# Patient Record
(demographics unavailable — no encounter records)

---

## 2024-10-24 NOTE — HISTORY OF PRESENT ILLNESS
[FreeTextEntry1] : hypoT, morbid obese, low testosterone \par  he has SHERLEY  treated with CPAP .Uses it every day\par  had CVA and has residual L weakness and memory impairment (searching for words) \par

## 2024-10-24 NOTE — PHYSICAL EXAM
[Alert] : alert [Well Nourished] : well nourished [Obese] : obese [No Acute Distress] : no acute distress [Well Developed] : well developed [Normal Sclera/Conjunctiva] : normal sclera/conjunctiva [EOMI] : extra ocular movement intact [No Proptosis] : no proptosis [Normal Oropharynx] : the oropharynx was normal [Thyroid Not Enlarged] : the thyroid was not enlarged [No Thyroid Nodules] : no palpable thyroid nodules [No Respiratory Distress] : no respiratory distress [No Accessory Muscle Use] : no accessory muscle use [Clear to Auscultation] : lungs were clear to auscultation bilaterally [Normal S1, S2] : normal S1 and S2 [Normal Rate] : heart rate was normal [Regular Rhythm] : with a regular rhythm [No Edema] : no peripheral edema [Pedal Pulses Normal] : the pedal pulses are present [Normal Bowel Sounds] : normal bowel sounds [Not Tender] : non-tender [Not Distended] : not distended [Soft] : abdomen soft [Normal Strength/Tone] : muscle strength and tone were normal [No Tremors] : no tremors [Oriented x3] : oriented to person, place, and time [Acanthosis Nigricans] : no acanthosis nigricans

## 2024-10-24 NOTE — ASSESSMENT
[FreeTextEntry1] : Gained again another 12 lbs since March 2024   HypoT in patient with morbid obesity, h/o CVA, prediabetes, HTn and HLD. pt euthyroid clinically and biochemically.  Cont LT4 200 mcg qd take 100 mcg qd on Sundays   Take daily in AM on an empty stomach at least 30 minutes before eating or taking other medication.  NTMNG : declines compressive sx  s/p FNA in Oct 2017 benign. repeat neck US in OCt 2023 shows stable repeat US now     Prediabetes / BMI 56 :  a1c better 5.6  encouraged more exercise walking 30 min 3 x week . Walks the dog at times. minimal effort   Obesity: encouraged more exercise walking 30 min 3 x week but he does not do any.  declines bariatric surgery due to his h/o  CVA   HTN :   bp at target on meds. Continue current management. I advised low fat/low cholesterol diet, low salt diet he has terrible diet with salty chips , carbs rich, no efforts on curtailing diet   HLD: lipids very good.  Continue atorvastatin 40 mg qd   Low libido., low testosterone  continue CPAP for SHERLEY .   Low platelets: probably ITP. will monitor.   All lab results reviewed independently and discussed with patient with extensive discussion.  All questions answered Laboratory tests ordered today Continue other current medications

## 2024-10-25 NOTE — HEALTH RISK ASSESSMENT
[Fair] : ~his/her~ current health as fair  [Good] : ~his/her~  mood as  good [Yes] : Yes [1 or 2 (0 pts)] : 1 or 2 (0 points) [1] : 1) Little interest or pleasure doing things for several days (1) [0] : 2) Feeling down, depressed, or hopeless: Not at all (0) [None] : None [With Significant Other] : lives with significant other [Retired] : retired [] :  [Sexually Active] : sexually active [Feels Safe at Home] : Feels safe at home [Fully functional (bathing, dressing, toileting, transferring, walking, feeding)] : Fully functional (bathing, dressing, toileting, transferring, walking, feeding) [Fully functional (using the telephone, shopping, preparing meals, housekeeping, doing laundry, using] : Fully functional and needs no help or supervision to perform IADLs (using the telephone, shopping, preparing meals, housekeeping, doing laundry, using transportation, managing medications and managing finances) [Smoke Detector] : smoke detector [Carbon Monoxide Detector] : carbon monoxide detector [Safety elements used in home] : safety elements used in home [Seat Belt] :  uses seat belt [Sunscreen] : uses sunscreen [Never] : Never [YES] : Yes [Are there any unlocked firearms stored in your household?] : There are unlocked firearms in the household. [Are there any firearms stored in your household that are loaded?] : There are firearms stored in the household loaded. [Have you attended a firearm safety workshop or class?] : A firearm safety workshop or class has been attended. [de-identified] : does not exercise [de-identified] : Eats more salt, not as goodas it should be [[id number]] : 1 [Change in mental status noted] : No change in mental status noted [Language] : denies difficulty with language [Behavior] : denies difficulty with behavior [Handling Complex Tasks] : denies difficulty handling complex tasks [Reasoning] : denies difficulty with reasoning [Spatial Ability and Orientation] : denies difficulty with spatial ability and orientation [High Risk Behavior] : no high risk behavior [Reports changes in dental health] : Reports no changes in dental health [ColonoscopyDate] : 2023 [ColonoscopyComments] : 1 precancerous polyp removed [FreeTextEntry2] : retired  [de-identified] : hearing loss in left ear [Are there any children in your household?] : No children are in the household. [Has anyone in the household been feeling low/depressed/been struggling?] : No one in the household has been feeling low/depressed/been struggling.

## 2024-10-25 NOTE — END OF VISIT
[] : Resident [FreeTextEntry3] : BP needs better controlled, advised strict low-salt diet, daily regular exercise, lose weight, add Diovan as ordered, BMP in 1 week, follow-up in 4 weeks to recheck blood pressure.

## 2024-10-25 NOTE — HISTORY OF PRESENT ILLNESS
[Spouse] : spouse [FreeTextEntry1] : 58M presents for NPA [de-identified] : 58M with PMHx of autoimmune thyroiditis, thrombocytopenia, HTN, SHERLEY, CVA 2016 with residula left sided weakness, torn meniscus and arthritis in both knees, obesity presents for NPA. Knee pain s/p steroid shots, and shoulder pain. Also having ankle swelling more so at night. Diet is not the best, eating a lot of food from outside, high salt. Uses menthol cream for joint pain. BP in the office high today, patient only on Metoprolol. Hx of being on Losartan but previously taken off.

## 2024-12-18 NOTE — DISCUSSION/SUMMARY
[FreeTextEntry1] : 1) SOB: With risk factors for CAD. Will get an echocardiogram and stress test.  2) Chest Pain: Stress test ordered.  3) First degree AV black: Getting echo and stress test.  4) HLD: C/w Atorvastatin 20mg QD  5) HTN: Controlled. C/w Valsartan 80mg QD and metoprolol  [EKG obtained to assist in diagnosis and management of assessed problem(s)] : EKG obtained to assist in diagnosis and management of assessed problem(s)

## 2024-12-18 NOTE — PHYSICAL EXAM
[Well Nourished] : well nourished [Normal Conjunctiva] : normal conjunctiva [No Carotid Bruit] : no carotid bruit [Normal S1, S2] : normal S1, S2 [No Murmur] : no murmur [Clear Lung Fields] : clear lung fields [Soft] : abdomen soft [Normal Gait] : normal gait [No Edema] : no edema [No Rash] : no rash [Moves all extremities] : moves all extremities [Alert and Oriented] : alert and oriented

## 2024-12-18 NOTE — HISTORY OF PRESENT ILLNESS
[FreeTextEntry1] : 58 year old male here for heart check up.  Has had a stroke.  Sometimes he gets a fluttering of the heart. Father has Afib.  No MIs in family. Some GARSIA and chest pain.

## 2025-01-22 NOTE — HISTORY OF PRESENT ILLNESS
[FreeTextEntry1] : F/U HTN / SHERLEY  [de-identified] : 58M with PMHx of autoimmune thyroiditis, thyroid nodule, thrombocytopenia, HTN, SHERLEY, CVA 2016 with residual left sided weakness, torn meniscus and arthritis in both knees, obesity Here for follow-up, no new complaints Seen by cardiology - had echo 12/2024 - will be going for stress test 02/2025   Thyroid nodule - 12/2024 stable, to repeat in 1 yr  Colonoscopy - 01/2023 - nl as per pt repeat in 5 yrs

## 2025-01-22 NOTE — PLAN
[FreeTextEntry1] :  HTN:  Stable, advised strict low salt diet, daily regular exercise, to c/w meds, bmp to be monitored closely. HLD:  Advised on strict low-fat diet, daily regular exercise, to c/w meds, FLP/LFT to be monitored. OA: stable currently, advised PT / tylenol prn pain , to f/u ortho  Obesity:  Advised on dietary, lifestyle modification.  To start a daily exercise regimen. To shift diet towards intake of plant-based foods and limit intake of meats and other animal-based foods. SHERLEY: stable on CPAP Hypothyroidism: stable on Synthroid, Thyroid Nodule: stable on US - to be monitored. f/u 3 mo

## 2025-03-03 NOTE — REVIEW OF SYSTEMS
[Cough] : cough [Fever] : no fever [Chills] : no chills [Fatigue] : no fatigue [Vision Problems] : no vision problems [Sore Throat] : no sore throat [Chest Pain] : no chest pain [Palpitations] : no palpitations [Shortness Of Breath] : no shortness of breath [Wheezing] : no wheezing [Abdominal Pain] : no abdominal pain [Constipation] : no constipation [Diarrhea] : no diarrhea [Dysuria] : no dysuria [Back Pain] : no back pain [Skin Rash] : no skin rash [Headache] : no headache [Dizziness] : no dizziness

## 2025-03-03 NOTE — HISTORY OF PRESENT ILLNESS
[FreeTextEntry8] : 58 M with PMHx of autoimmune thyroiditis, thrombocytopenia, HTN, SHERLEY, CVA 2016 with residual left sided weakness, torn meniscus and arthritis in both knees, obesity presenting due to cough with green sputum for the past 2-3 days  denies fevers, chills, HA, dizziness, chest pain, SOB, GARSIA, abdominal pain, Diarrhea, constipation, or lower leg pain

## 2025-03-03 NOTE — PHYSICAL EXAM
[No Lymphadenopathy] : no lymphadenopathy [Supple] : supple [Normal Posterior Cervical Nodes] : no posterior cervical lymphadenopathy [Normal Anterior Cervical Nodes] : no anterior cervical lymphadenopathy [Normal] : no joint swelling and grossly normal strength and tone [No Rash] : no rash [Coordination Grossly Intact] : coordination grossly intact [Normal Gait] : normal gait [Normal Affect] : the affect was normal [Normal Insight/Judgement] : insight and judgment were intact [de-identified] : + 1 bl edema of lower extremities [de-identified] : Minimal weakness of L side compared to R side

## 2025-03-03 NOTE — ASSESSMENT
[FreeTextEntry1] : URI - likely viral -respiratory swab sent - Sent benzonatate for PRN cough - advised to clean CPAP well - Advised to RTC if sx worsen or call, can consider abx treatment then

## 2025-03-13 NOTE — CONSULT LETTER
[Dear  ___] : Dear  [unfilled], [Consult Letter:] : I had the pleasure of evaluating your patient, [unfilled]. [Please see my note below.] : Please see my note below. [Consult Closing:] : Thank you very much for allowing me to participate in the care of this patient.  If you have any questions, please do not hesitate to contact me. [Sincerely,] : Sincerely, [FreeTextEntry3] : Anthony Newton MD, FCCP, D. ABSM\par  Pulmonary and Sleep Medicine\par  [facility] [place]

## 2025-03-13 NOTE — REVIEW OF SYSTEMS
[Nasal Congestion] : nasal congestion [Postnasal Drip] : postnasal drip [SOB on Exertion] : sob on exertion [Back Pain] : back pain [Thyroid Problem] : thyroid problem [Obesity] : obesity [Negative] : Psychiatric [Diabetes] : no diabetes

## 2025-03-13 NOTE — RESULTS/DATA
[TextEntry] : S/N PSG from 9/3/16 revealed severe SHERLEY with an AHI of 96 and a therapeutic pressure of 12 cm of water. Chest CT from 3/11/19 revealed a 4 mm LLL nodule; f/u per [facility] program. CXR from 2/22/23 was clear per report. The patient's overall compliance is % with a >4hr compliance of % on autoCPAP with a median and max pressure of 11.4-12.6 and 14.8-15.4 cm of water, respectively with a residual AHI of 0.4-0.8 which is normal. He uses his old machine at 12 cm of water when he is away with 100% compliance and AHI<1.

## 2025-03-13 NOTE — PROCEDURE
[FreeTextEntry1] : PFTs performed previously revealed a mildly reduced FVC and FEV1 without an obstructive pattern. Lung volumes and diffusion capacity were essentially normal. Spirometry 12/13/19 - mild restriction but slightly improved from previous. PFTs 6/11/21 - Mild restriction slightly worse from previous but with normal lung volumes and mildly reduced diffusion capacity which corrected for alveolar volume; somewhat worse than previous. PFTs 6/10/22 - Mild restriction with mildly reduced lung volumes but normal diffusion capacity. [place] 2/22/23 - Moderate restriction.

## 2025-03-13 NOTE — PHYSICAL EXAM
[No Acute Distress] : no acute distress [Well Nourished] : well nourished [Normal Appearance] : normal appearance [Supple] : supple [Normal Rate/Rhythm] : normal rate/rhythm [Normal S1, S2] : normal s1, s2 [No Murmurs] : no murmurs [No Resp Distress] : no resp distress [No Acc Muscle Use] : no acc muscle use [Normal Rhythm and Effort] : normal rhythm and effort [Clear to Auscultation Bilaterally] : clear to auscultation bilaterally [No Abnormalities] : no abnormalities [Benign] : benign [Not Tender] : not tender [Soft] : soft [No Clubbing] : no clubbing [No Edema] : no edema [No Focal Deficits] : no focal deficits [Oriented x3] : oriented x3 [TextBox_2] : Morbidly obese

## 2025-03-13 NOTE — HISTORY OF PRESENT ILLNESS
[Good Compliance] : good compliance with treatment [Good Tolerance] : good tolerance of treatment [Good Symptom Control] : good symptom control [Follow-Up - Routine Clinic] : a routine clinic follow-up of [Excess Weight] : excess weight [Dyspnea] : dyspnea [Joint Pain] : joint pain [Low Calorie Diet] : low calorie diet [Fair Compliance] : fair compliance with treatment [Fair Tolerance] : fair tolerance of treatment [Poor Symptom Control] : poor symptom control [Dyslipidemia] : dyslipidemia [Sleep Apnea] : sleep apnea [Hypertension] : hypertension [High] : high [Low Calorie] : low calorie [Well Balanced Diet] : well balanced meals [Infrequently] : exercises infrequently [Walking] : walking [On ___] : performed on [unfilled] [Patient] : the patient [Indication ___] : for an indication of [unfilled] [None] : no new symptoms reported [TextBox_4] : Patient c/o SOBOE but is otherwise without associated respiratory complaints. Pt had a loop recorder for 3 years without any significant arrhythmias. He no longer has the loop recorder but is still following with cardiology. Pt has had a tough time since his partner committed suicide and has gained nearly 30 lbs since. Pt with possible Covid infection in late June 2022 with fatigue, fevers, body aches but was testing negative. Symptoms lasted one day but then developed ear discomfort so is following with ENT. Followed by endocrine for hypothyroid and nodules. Pt is recovering from URI for which he was initially treated with Tessalon Perles and then with Z-anabel with near resolution of symptoms. Now at baseline.  [FreeTextEntry1] : \par   [de-identified] : autoCPAP [FreeTextEntry9] : Chest CT [FreeTextEntry8] : 4 mm LLL nodule [TextEntry] : Chest CT from 2/5/20 revealed a stable 4 mm LLL nodule.

## 2025-03-13 NOTE — END OF VISIT
[Time Spent: ___ minutes] : I have spent [unfilled] minutes of time on the encounter which excludes teaching and separately reported services. [TextEntry] : Discussed with pt at length regarding SHERLEY, morbid obesity, SOBOE, abnormal CT, nodule; reviewed prior PFTs and current compliance with pt.

## 2025-03-13 NOTE — DISCUSSION/SUMMARY
[FreeTextEntry1] : #1. Continue autoCPAP at 10-16 cm of water though he mostly uses his machine which is on 12 cm of water. The patient is using and benefitting from CPAP therapy.  #2. Diet and exercise for weight loss. #3. Replacement equipment as needed; ordered new machine 9/17/24. #4. SOBOE is likely weight related. #5. Mildly restrictive pattern on PFTs is likely related to weight; f/u PFTs with weight loss. Follows with [facility] for fer and CXR. #6. F/u in 6 months for reevaluation with compliance and imaging studies and lung function testing through [facility]; pt does not want any further imaging studies for now. #7. Pt had both Covid vaccines; rec booster and flu vaccine. #8. Pt to f/u with [facility] program for f/u imaging studies for previously seen 4 mm LLL nodule which is likely benign in this never smoker; further imaging per [facility] program; pt to bring in other imaging studies if available.  The patient expressed understanding and agreement with the above recommendations/plan and accepts responsibility to be compliant with recommended testing, therapies, and f/u visits. All relevant questions and concerns were addressed.

## 2025-03-13 NOTE — REASON FOR VISIT
[Follow-Up] : a follow-up visit [Abnormal CXR/ Chest CT] : an abnormal CXR/ chest CT [Sleep Apnea] : sleep apnea [Shortness of Breath] : shortness of breath [Pulmonary Nodules] : pulmonary nodules [Obesity] : obesity

## 2025-04-22 NOTE — HISTORY OF PRESENT ILLNESS
[FreeTextEntry1] : f/u HTN / SHERLEY  [de-identified] : 58M with PMHx of autoimmune thyroiditis, thyroid nodule, thrombocytopenia, HTN, SHERLEY, CVA 2016 with residual left sided weakness, torn meniscus and arthritis in both knees, obesity Here for follow-up, no new complaints Seen by cardiology - had echo 12/2024 -  stress test 03/2025 - normal study  Thyroid nodule - 12/2024 stable, to repeat in 1 yr  Colonoscopy - 01/2023 - nl as per pt repeat in 5 yrs

## 2025-04-22 NOTE — PLAN
[FreeTextEntry1] : A/P; HTN:  needs better control, advised strict low salt diet, daily regular exercise, to c/w meds, bmp to be monitored closely, declines to inc dose today - will reassess next visit.  HLD:  Advised on strict low-fat diet, daily regular exercise, to c/w meds, FLP/LFT to be monitored.  OA: stable currently, advised PT / tylenol prn pain , to f/u ortho   Obesity:  Advised on dietary, lifestyle modification.  To start a daily exercise regimen. To shift diet towards intake of plant-based foods and limit intake of meats and other animal-based foods.  SHERLEY: stable on CPAP  Hypothyroidism: stable on Synthroid, recent TSH discussed - he will d/w endocrine   Thyroid Nodule: stable on US - to be monitored. f/u 3 mo

## 2025-06-20 NOTE — ASSESSMENT
[FreeTextEntry1] : Gained again another 12 lbs since March 2024   HypoT in patient with morbid obesity, h/o CVA, prediabetes, HTn and HLD. Tsh a little off.  Continue LT4 200 mcg qd take 100 mcg qd on Sundays   Take daily in AM on an empty stomach at least 30 minutes before eating or taking other medication.  NTMNG : declines compressive sx  s/p FNA in Oct 2017 benign. repeat neck US in OCt 2023 shows stable repeat US now     Prediabetes / BMI 56 :  a1c better 5.7  encouraged more exercise walking 30 min 3 x week . Walks the dog at times. minimal effort   Obesity: encouraged more exercise walking 30 min 3 x week but he joined gym  declines bariatric surgery due to his h/o  CVA  now he does not want glp1 bc of risk of pancreatic cancer   HTN :   bp at target on meds. Continue current management. I advised low fat/low cholesterol diet, low salt diet   HLD: lipids very good.  Continue atorvastatin 40 mg qd   Low libido., low testosterone  continue CPAP for SHERLEY .   Low platelets: probably ITP. will monitor.   Vitamin  defic : cont supplements.   All lab results reviewed independently and discussed with patient with extensive discussion.  All questions answered Laboratory tests ordered today Continue other current medications

## 2025-07-09 NOTE — CONSULT LETTER
[Dear  ___] : Dear  [unfilled], [Courtesy Letter:] : I had the pleasure of seeing your patient, [unfilled], in my office today. [Please see my note below.] : Please see my note below. [Consult Closing:] : Thank you very much for allowing me to participate in the care of this patient.  If you have any questions, please do not hesitate to contact me. [Sincerely,] : Sincerely, [FreeTextEntry3] : Daron Montano M.D., Ph.D. DPN-[facility] [place] Director, Division of Neurology Director, [facility]

## 2025-07-09 NOTE — ASSESSMENT
[FreeTextEntry1] : This is a sick 58-year-old man with history of stroke.  Involves some mild left-sided weakness and slowing of movements.  Otherwise he is doing well.  He should continue to address prediabetes to keep his blood sugar under 7.0.  He we will address his hypertension and keep his blood pressure under 130/80.  He should address his hyperlipidemia and keep his LDL under 70.  He should address his obesity with continued exercise and weight loss.  Will continue daily aspirin.  I will see him back in 1 year for routine neurologic follow-up, sooner should the need arise.

## 2025-07-09 NOTE — PHYSICAL EXAM
[Person] : oriented to person [Place] : oriented to place [Time] : oriented to time [Remote Intact] : remote memory intact [Registration Intact] : recent registration memory intact [Span Intact] : the attention span was normal [Concentration Intact] : normal concentrating ability [Visual Intact] : visual attention was ~T not ~L decreased [Naming Objects] : no difficulty naming common objects [Repeating Phrases] : no difficulty repeating a phrase [Fluency] : fluency intact [Comprehension] : comprehension intact [Current Events] : adequate knowledge of current events [Past History] : adequate knowledge of personal past history [Cranial Nerves Optic (II)] : visual acuity intact bilaterally,  visual fields full to confrontation, pupils equal round and reactive to light [Cranial Nerves Oculomotor (III)] : extraocular motion intact [Cranial Nerves Trigeminal (V)] : facial sensation intact symmetrically [Cranial Nerves Facial (VII)] : face symmetrical [Cranial Nerves Vestibulocochlear (VIII)] : hearing was intact bilaterally [Cranial Nerves Glossopharyngeal (IX)] : tongue and palate midline [Cranial Nerves Accessory (XI - Cranial And Spinal)] : head turning and shoulder shrug symmetric [Cranial Nerves Hypoglossal (XII)] : there was no tongue deviation with protrusion [Motor Tone] : muscle tone was normal in all four extremities [Motor Strength] : muscle strength was normal in all four extremities [Involuntary Movements] : no involuntary movements were seen [No Muscle Atrophy] : normal bulk in all four extremities [Motor Handedness Right-Handed] : the patient is right hand dominant [Hemiparesis Of Left Side] : hemiparesis was present on the left [Paresis Pronator Drift Right-Sided] : no pronator drift on the right [Paresis Pronator Drift Left-Sided] : no pronator drift on the left [Motor Strength Upper Extremities Bilaterally] : strength was normal in both upper extremities [Motor Strength Lower Extremities Bilaterally] : strength was normal in both lower extremities [Sensation Tactile Decrease] : light touch was intact [Sensation Pain / Temperature Decrease] : pain and temperature was intact [Sensation Vibration Decrease] : vibration was intact [Proprioception] : proprioception was intact [Abnormal Walk] : normal gait [Balance] : balance was intact [Tremor] : no tremor present [Coordination - Dysmetria Impaired Finger-to-Nose Bilateral] : not present [2+] : Patella left 2+ [FreeTextEntry6] : Mild slowing with movements on the left but no significant weakness today [Sclera] : the sclera and conjunctiva were normal [PERRL With Normal Accommodation] : pupils were equal in size, round, reactive to light, with normal accommodation [Extraocular Movements] : extraocular movements were intact [No APD] : no afferent pupillary defect [No NISA] : no internuclear ophthalmoplegia [Full Visual Field] : full visual field

## 2025-07-09 NOTE — HISTORY OF PRESENT ILLNESS
[FreeTextEntry1] : 10/3/17: This is a 51-year-old man follows up today for history of stroke. He continues to have some mild left-sided weakness as a result of the stroke. He has issues with arthritis and torn meniscus of both knees and is a bit unsteady on his feet at times because of this as well. He gets tired towards the end of the day frequently. He will have occasional ocular type migraines and reports having had 2 or 3 since he was seen about 6 months ago. He had a recent thyroid biopsy and the results are pending. He is here today for his routine neurologic followup.  Followup April 9, 2018: This is a 51-year-old man returns for followup of stroke. He is continuing to have some mild left-sided weakness, he trains himself at the gym. He continues to be overweight and continues to be treated for hypertension hyperlipidemia. He states his blood pressures been under adequate control and met his cholesterol was recently checked and were acceptable. He is not having any new signs or symptoms from a stroke. There is nothing that worsens or improves left-sided weakness. He is here today for neurologic followup.  Followup October 9, 2018: This is a 52-year-old man who presents today for followup of stroke. He hands risk factors including obesity hypertension and hypercholesterolemia. He is currently medicated for the hypertension hypercholesterolemia. He states that his blood pressure and lipids are under control. He hurt his back recently so is not able to do as much as he use to the gym. He continues to have complaints of mild left-sided weakness. He is here in neurologic followup.  Followup April 15, 2019: This is a 52-year-old man who returns for followup of stroke. His risk factors include obesity, hypertension hypercholesterolemia. He continues to Medicaid for hypertension hypercholesterolemia. He unfortunately has not been able to lose weight. He has no new problems and is some mild baseline left-sided weakness. He is here today for neurologic followup.  Followup October 15, 2019: This is a 53-year-old man he returns today for followup of stroke. He has multiple risk factors including morbid obesity,  prediabetes, hyperlipidemia and hypertension. His dressing off medication. He follows with an endocrinologist. He has fluctuated in his weight. He was recently 407 pounds and is now down to 380 pounds. He is continuing to have residual left-sided weakness which is mild. He is here today for neurologic followup.  Followup April 19, 2021: This is a 54-year-old man who presents today for followup of stroke. He has multiple risk factors including morbid obesity 3 diabetes hyperlipidemia and hypertension. He is currently exercising in the gamma both lifting weights as well as working on a bicycle to help lose weight. He is taking aspirin as well as medication to control cholesterol and blood pressure. He has not had any sneeze symptoms are sharp. He is here today for neurologic followup.  Follow-up September 1, 2022: This is a 55-year-old man who presents today for follow-up of stroke.  He has multiple risk factors including morbid obesity, prediabetes, hyperlipidemia and hypertension.  He continues to have some mild left-sided weakness.  He is not experiencing any new symptoms.  He is here today for routine follow-up.  Follow-up July 12, 2024: This is a 57-year-old man who presents today with history of stroke.  He has multiple risk factors including prediabetes hyperlipidemia hypertension morbid obesity.  He is doing well he just has little bit of slowing with movements on the left side but to the weakness is largely improved.  He is continue to take aspirin as well as medications for hypertension and hyperlipidemia.  He is here today for follow-up.  Follow-up July 9, 2025: This is a 58-year-old man who presented with history of stroke.  He has multiple risk factors including prediabetes, hyperlipidemia, hypertension and morbid obesity.  He is currently seeing endocrine for the prediabetes and obesity.  He also takes medications for hyperlipidemia as well as hypertension.  He states his blood pressure has been running well.  He continues to take aspirin for secondary stroke prevention.  He is trying to exercise now that his college-age son is at home they go to the gym together.  He is here today for follow-up.

## 2025-07-22 NOTE — PLAN
[FreeTextEntry1] : A/P; HTN:  Stable, advised strict low salt diet, daily regular exercise, to c/w meds, bmp to be monitored closely.  HLD:  Advised on strict low-fat diet, daily regular exercise, to c/w meds, FLP/LFT to be monitored.  OA: stable currently, advised PT / tylenol prn pain , to f/u ortho   Obesity:  Advised on dietary, lifestyle modification. To start a daily exercise regimen. To shift diet towards intake of plant-based foods and limit intake of meats and other animal-based foods.  SHERLEY: stable on CPAP  Hypothyroidism: stable on Synthroid, to repeat TSH and reassess.  Vit D def: to c/w replacement  Thyroid Nodule: stable on US - to be monitored. f/u 3 mo

## 2025-07-22 NOTE — HISTORY OF PRESENT ILLNESS
[FreeTextEntry1] : f/u HTN / Obesity [de-identified] : 58M with PMHx of autoimmune thyroiditis, thyroid nodule, thrombocytopenia, HTN, SHERLEY, CVA 2016 with residual left sided weakness, torn meniscus and arthritis in both knees, obesity Here for follow-up, no new complaints Seen by cardiology - had echo 12/2024 -  stress test 03/2025 - normal study  Thyroid nodule - 12/2024 stable, to repeat in 1 yr  Colonoscopy - 01/2023 - nl as per pt repeat in 5 yrs